# Patient Record
Sex: MALE | Race: WHITE
[De-identification: names, ages, dates, MRNs, and addresses within clinical notes are randomized per-mention and may not be internally consistent; named-entity substitution may affect disease eponyms.]

---

## 2021-06-13 ENCOUNTER — HOSPITAL ENCOUNTER (EMERGENCY)
Dept: HOSPITAL 11 - JP.ED | Age: 81
Discharge: HOME | End: 2021-06-13
Payer: MEDICARE

## 2021-06-13 DIAGNOSIS — B02.31: Primary | ICD-10-CM

## 2021-06-13 NOTE — EDM.PDOC
ED HPI GENERAL MEDICAL PROBLEM





- General


Chief Complaint: Skin Complaint


Stated Complaint: SHINGLES BY EYE


Time Seen by Provider: 06/13/21 13:00


Source of Information: Reports: Patient


History Limitations: Reports: Other (no old records)





- History of Present Illness


INITIAL COMMENTS - FREE TEXT/NARRATIVE: 





82 yo male visiting from [facility] developed shingles about the time he left 

home. Over the past few days the shingles has progressed. He was seen recently 

in the local [facility] and was given an antiviral agent. Today Dr. Mcrae who lives near where Jose was visiting looked at the eye and told 

him he needed to come to the ER for an antibiotic. Patient has a pHx of CRF and 

says his pain is not bad. He has already been advised to seek ophthalmologic 

care regarding his R eye involvement. 


Onset: Gradual


Duration: Day(s):, Getting Worse


Location: Reports: Face


Quality: Reports: Dull


Severity: Mild


Improves with: Reports: None


Worsens with: Reports: Other (? time)


Context: Reports: Other (See HPI)


Associated Symptoms: Reports: No Other Symptoms


Treatments PTA: Reports: Other (see below) (valacyclovir)





- Related Data


Home Meds: 


                                    Home Meds





Erythromycin Base [Erythromycin 0.5% Ophth Oint] 1 applic OP Q6H #1 tube 

06/13/21 [Rx]











ED ROS GENERAL





- Review of Systems


Review Of Systems: See Below


Constitutional: Reports: No Symptoms


HEENT: Reports: Other (R eye red with swelling around the eye)


Skin: Reports: Rash (rash R side of face and around the R eye consistent with 

shingles)


Neurological: Reports: No Symptoms





ED EXAM, SKIN/RASH


Exam: See Below


Exam Limited By: No Limitations


General Appearance: Alert, WD/WN, No Apparent Distress


Eye Exam: Right Eye: Conjunctival Injection (no purulent drainage), PERRL


Ears: Normal External Exam, Normal Canal, Hearing Grossly Normal


Nose: Normal Inspection, No Blood


Throat/Mouth: Normal Lips, Normal Oropharynx, Normal Voice, No Airway Compromise


Skin: Warm, Dry, Zoster-Like Rash (R face from posterior to the temple to the 

nose including the R eye)


Location, Skin: Face


Characteristics: Vesicular, Other (crusting of most of the lesions)


Associated features: No: Warmth, Lymphangitis





Course





- Vital Signs


Last Recorded V/S: 





                                Last Vital Signs











Temp  36.6 C   06/13/21 13:02


 


Pulse  66   06/13/21 13:02


 


Resp  18   06/13/21 13:02


 


BP  162/80 H  06/13/21 13:02


 


Pulse Ox  98   06/13/21 13:02














Departure





- Departure


Time of Disposition: 13:20


Disposition: Home, Self-Care 01


Condition: Fair


Clinical Impression: 


Shingles


Qualifiers:


 Herpes zoster complications: with ocular involvement Herpes zoster ocular 

complication detail: conjunctivitis Qualified Code(s): B02.31 - Zoster 

conjunctivitis








- Discharge Information


*PRESCRIPTION DRUG MONITORING PROGRAM REVIEWED*: Not Applicable


*COPY OF PRESCRIPTION DRUG MONITORING REPORT IN PATIENT [facility]ADONNA: Not Applicable


Prescriptions: 


Erythromycin Base [Erythromycin 0.5% Ophth Oint] 1 applic OP Q6H #1 tube


Referrals: 


PCP,None [Primary Care Provider] - 


Additional Instructions: 


Continue the shingles medicine. Add the erythromycin ointment as directed. See 

an eye specialist asap to make sure that you do not have serious eye 

complications. 





Sepsis Event Note (ED)





- Focused Exam


Vital Signs: 





                                   Vital Signs











  Temp Pulse Resp BP Pulse Ox


 


 06/13/21 13:02  36.6 C  66  18  162/80 H  98